# Patient Record
Sex: MALE | Race: WHITE | Employment: FULL TIME | URBAN - METROPOLITAN AREA
[De-identification: names, ages, dates, MRNs, and addresses within clinical notes are randomized per-mention and may not be internally consistent; named-entity substitution may affect disease eponyms.]

---

## 2018-12-20 ENCOUNTER — HOSPITAL ENCOUNTER (OUTPATIENT)
Age: 33
Discharge: HOME | End: 2018-12-20
Attending: EMERGENCY MEDICINE
Payer: COMMERCIAL

## 2018-12-20 VITALS
TEMPERATURE: 99 F | WEIGHT: 205 LBS | SYSTOLIC BLOOD PRESSURE: 142 MMHG | DIASTOLIC BLOOD PRESSURE: 85 MMHG | OXYGEN SATURATION: 97 % | HEART RATE: 78 BPM | RESPIRATION RATE: 18 BRPM

## 2018-12-20 DIAGNOSIS — J06.9 VIRAL UPPER RESPIRATORY INFECTION: Primary | ICD-10-CM

## 2018-12-20 DIAGNOSIS — H69.82 EUSTACHIAN TUBE DYSFUNCTION, LEFT: ICD-10-CM

## 2018-12-20 PROCEDURE — 99202 OFFICE O/P NEW SF 15 MIN: CPT

## 2018-12-20 RX ORDER — TEMAZEPAM 30 MG/1
60 CAPSULE ORAL NIGHTLY
COMMUNITY

## 2018-12-20 RX ORDER — PAROXETINE HYDROCHLORIDE 40 MG/1
40 TABLET, FILM COATED ORAL EVERY MORNING
COMMUNITY

## 2018-12-20 RX ORDER — FLUTICASONE PROPIONATE 50 MCG
1-2 SPRAY, SUSPENSION (ML) NASAL DAILY
Qty: 16 G | Refills: 0 | Status: SHIPPED | OUTPATIENT
Start: 2018-12-20 | End: 2019-01-19

## 2018-12-20 RX ORDER — OMEPRAZOLE 20 MG/1
40 CAPSULE, DELAYED RELEASE ORAL EVERY MORNING
COMMUNITY

## 2018-12-20 NOTE — ED PROVIDER NOTES
[facility]    History   Patient presents with:  Ear Problem Pain (neurosensory)    Stated Complaint: EAR PAIN/PRESSURE    HPI  Patient noted a mild runny nose and scratchiness to the back of his throat an Nose: Rhinorrhea present. Mouth/Throat: Uvula is midline, oropharynx is clear and moist and mucous membranes are normal.   Eyes: Conjunctivae and EOM are normal.   Neck: Normal range of motion. Neck supple.    Cardiovascular: Normal rate, regular rhythm

## 2019-01-12 ENCOUNTER — HOSPITAL ENCOUNTER (OUTPATIENT)
Age: 34
Discharge: HOME | End: 2019-01-12
Attending: FAMILY MEDICINE
Payer: COMMERCIAL

## 2019-01-12 VITALS
OXYGEN SATURATION: 95 % | WEIGHT: 205 LBS | RESPIRATION RATE: 18 BRPM | HEART RATE: 99 BPM | HEIGHT: 71 IN | BODY MASS INDEX: 28.7 KG/M2 | SYSTOLIC BLOOD PRESSURE: 148 MMHG | DIASTOLIC BLOOD PRESSURE: 97 MMHG | TEMPERATURE: 98 F

## 2019-01-12 DIAGNOSIS — J01.90 ACUTE SINUSITIS, RECURRENCE NOT SPECIFIED, UNSPECIFIED LOCATION: Primary | ICD-10-CM

## 2019-01-12 PROCEDURE — 99214 OFFICE O/P EST MOD 30 MIN: CPT

## 2019-01-12 PROCEDURE — 99213 OFFICE O/P EST LOW 20 MIN: CPT

## 2019-01-12 RX ORDER — AMOXICILLIN AND CLAVULANATE POTASSIUM 875; 125 MG/1; MG/1
1 TABLET, FILM COATED ORAL 2 TIMES DAILY
Qty: 20 TABLET | Refills: 0 | Status: SHIPPED | OUTPATIENT
Start: 2019-01-12 | End: 2019-01-22

## 2019-01-12 NOTE — ED INITIAL ASSESSMENT (HPI)
States sick for 1 month with \"sinus issues\". Seen here 12/20 and prescribed flonase which relieved some of his symptoms. Follow up with PMD shortly after and given amoxicillin. Completed 10 day course without relief. C/o \"green\" sinus drainage.  Facial

## 2019-01-12 NOTE — ED PROVIDER NOTES
Pt seen in [facility] [place]      Stated Complaint: Patient presents with:  Cough/URI      --------------   RN assessment:     Sinus issues x I mo; s/p abtc's (?12/23-1/2). ..persist  --------------    CC: sinus congestion    HPI: negative for claudication and palpitations  Gastrointestinal: negative for  jaundice and odynophagia  Genitourinary:negative for decreased stream and nocturia  Behavioral/Psych: negative for aggressive behavior and hallucinations  10 point review of system Impression:    Acute sinusitis, recurrence not specified, unspecified location  (primary encounter diagnosis)    Disposition: Home     Discharge    Follow-up:    No follow-up provider specified.       Medications Prescribed:    Current Discharge Medication

## 2019-01-25 ENCOUNTER — OFFICE VISIT (OUTPATIENT)
Dept: AUDIOLOGY | Facility: CLINIC | Age: 34
End: 2019-01-25
Payer: COMMERCIAL

## 2019-01-25 ENCOUNTER — OFFICE VISIT (OUTPATIENT)
Dept: OTOLARYNGOLOGY | Facility: CLINIC | Age: 34
End: 2019-01-25
Payer: COMMERCIAL

## 2019-01-25 VITALS — WEIGHT: 205 LBS | HEIGHT: 71 IN | BODY MASS INDEX: 28.7 KG/M2

## 2019-01-25 DIAGNOSIS — H93.13 RINGING IN EAR, BILATERAL: Primary | ICD-10-CM

## 2019-01-25 DIAGNOSIS — H93.13 TINNITUS, BILATERAL: Primary | ICD-10-CM

## 2019-01-25 PROCEDURE — 99212 OFFICE O/P EST SF 10 MIN: CPT | Performed by: OTOLARYNGOLOGY

## 2019-01-25 PROCEDURE — 92567 TYMPANOMETRY: CPT | Performed by: AUDIOLOGIST

## 2019-01-25 PROCEDURE — 92557 COMPREHENSIVE HEARING TEST: CPT | Performed by: AUDIOLOGIST

## 2019-01-25 PROCEDURE — 99243 OFF/OP CNSLTJ NEW/EST LOW 30: CPT | Performed by: OTOLARYNGOLOGY

## 2019-01-25 NOTE — PROGRESS NOTES
Lelia Ravi is a 35year old male.  Patient presents with:  Ringing In Ear: in left ear  Nose Problem: had a sinus infection, completed antibiotics, nasal congestion, sore throat on right side, ears feels plugged     HPI:   He has had a ringing in his lef person & situation. Appropriate mood and affect. Lymph Detail Normal Submental. Submandibular. Anterior cervical. Posterior cervical. Supraclavicular.    Eyes Normal Conjunctiva - Right: Normal, Left: Normal. Pupil - Right: Normal, Left: Normal.    Ears N

## 2019-01-29 NOTE — PROGRESS NOTES
AUDIOLOGY REPORT      Cori Garces is a 35year old male     Referring Provider: Janae Weiss   YOB: 1985  Medical Record: [id number]      Patient Hearing History:  Patient reported tinnitus.        Otoscopic Inspection:  Right ear:  No ceru

## 2021-11-30 ENCOUNTER — HOSPITAL ENCOUNTER (EMERGENCY)
Facility: HOSPITAL | Age: 36
Discharge: HOME | End: 2021-11-30
Payer: COMMERCIAL

## 2021-11-30 ENCOUNTER — APPOINTMENT (OUTPATIENT)
Dept: CT IMAGING | Facility: HOSPITAL | Age: 36
End: 2021-11-30
Attending: NURSE PRACTITIONER
Payer: COMMERCIAL

## 2021-11-30 ENCOUNTER — HOSPITAL ENCOUNTER (OUTPATIENT)
Age: 36
Discharge: ANOTHER HOSPITAL | End: 2021-11-30
Attending: EMERGENCY MEDICINE
Payer: COMMERCIAL

## 2021-11-30 VITALS
SYSTOLIC BLOOD PRESSURE: 130 MMHG | DIASTOLIC BLOOD PRESSURE: 90 MMHG | RESPIRATION RATE: 18 BRPM | TEMPERATURE: 98 F | OXYGEN SATURATION: 100 % | HEART RATE: 100 BPM

## 2021-11-30 VITALS
HEIGHT: 71 IN | RESPIRATION RATE: 18 BRPM | TEMPERATURE: 98 F | OXYGEN SATURATION: 98 % | BODY MASS INDEX: 26.6 KG/M2 | DIASTOLIC BLOOD PRESSURE: 84 MMHG | HEART RATE: 75 BPM | SYSTOLIC BLOOD PRESSURE: 155 MMHG | WEIGHT: 190 LBS

## 2021-11-30 DIAGNOSIS — R10.10 UPPER ABDOMINAL PAIN: Primary | ICD-10-CM

## 2021-11-30 DIAGNOSIS — K52.9 ACUTE GASTROENTERITIS: Primary | ICD-10-CM

## 2021-11-30 PROCEDURE — 99213 OFFICE O/P EST LOW 20 MIN: CPT

## 2021-11-30 PROCEDURE — 36415 COLL VENOUS BLD VENIPUNCTURE: CPT

## 2021-11-30 PROCEDURE — 85025 COMPLETE CBC W/AUTO DIFF WBC: CPT

## 2021-11-30 PROCEDURE — 80053 COMPREHEN METABOLIC PANEL: CPT

## 2021-11-30 PROCEDURE — 81002 URINALYSIS NONAUTO W/O SCOPE: CPT

## 2021-11-30 PROCEDURE — 74177 CT ABD & PELVIS W/CONTRAST: CPT | Performed by: NURSE PRACTITIONER

## 2021-11-30 PROCEDURE — 99214 OFFICE O/P EST MOD 30 MIN: CPT

## 2021-11-30 PROCEDURE — 87086 URINE CULTURE/COLONY COUNT: CPT | Performed by: EMERGENCY MEDICINE

## 2021-11-30 PROCEDURE — 99284 EMERGENCY DEPT VISIT MOD MDM: CPT

## 2021-11-30 PROCEDURE — 83690 ASSAY OF LIPASE: CPT

## 2021-11-30 NOTE — ED INITIAL ASSESSMENT (HPI)
Patient c/o of bilateral lower abdominal pain for 2 days  Bloated  Soft stool today- denies any blood in stool  Nausea, no emesis or diarrhea  Denies any urinary symptoms

## 2021-11-30 NOTE — ED PROVIDER NOTES
Patient Seen in: [facility] [place]      History   Patient presents with:  Abdominal Pain    Stated Complaint: ABDOMINAL PAIN    Subjective:   HPI    59-year-old male presents for evaluation of abdominal pain.   Patient reports since Sunday night he h Tenderness: There is abdominal tenderness in the right upper quadrant and epigastric area. Musculoskeletal:         General: Normal range of motion. Cervical back: Normal range of motion and neck supple. Skin:     General: Skin is warm and dry.

## 2021-12-01 NOTE — ED PROVIDER NOTES
Patient Seen in: [facility]      History   Patient presents with:  Abdomen/Flank Pain    Stated Complaint: abd pain    Subjective:   49-year-old male presenting with upper abdominal pain onset 2 days ago.   He describes the [place] 155/84   Pulse 75   Temp 97.9 °F (36.6 °C) (Temporal)   Resp 18   Ht 180.3 cm (5' 11\")   Wt 86.2 kg   SpO2 98%   BMI 26.50 kg/m²         Physical Exam  Vitals and nursing note reviewed. Constitutional:       Appearance: Normal appearance.    HENT:      H Status                     ---------                               -----------         ------                     CBC W/ DIFFERENTIAL[[id number]]                              Final result                 Please view results for these tests on the individual Candidate normal appendix in the right lower quadrant (series 5, image 47). URINARY BLADDER: Incompletely distended and suboptimally evaluated. PELVIC NODES: No lymphadenopathy. PELVIC ORGANS: No visible mass. Pelvic organs appropriate for patient age. MD  [address] [phone]      As needed          Medications Prescribed:  Current Discharge Medication List

## 2024-02-14 ENCOUNTER — HOSPITAL ENCOUNTER (OUTPATIENT)
Age: 39
Discharge: HOME | End: 2024-02-14
Payer: COMMERCIAL

## 2024-02-14 VITALS
TEMPERATURE: 98 F | OXYGEN SATURATION: 97 % | RESPIRATION RATE: 16 BRPM | HEART RATE: 98 BPM | SYSTOLIC BLOOD PRESSURE: 128 MMHG | DIASTOLIC BLOOD PRESSURE: 89 MMHG

## 2024-02-14 DIAGNOSIS — H66.002 NON-RECURRENT ACUTE SUPPURATIVE OTITIS MEDIA OF LEFT EAR WITHOUT SPONTANEOUS RUPTURE OF TYMPANIC MEMBRANE: Primary | ICD-10-CM

## 2024-02-14 PROCEDURE — 99214 OFFICE O/P EST MOD 30 MIN: CPT

## 2024-02-14 PROCEDURE — 99213 OFFICE O/P EST LOW 20 MIN: CPT

## 2024-02-14 RX ORDER — AMOXICILLIN 500 MG/1
500 TABLET, FILM COATED ORAL 3 TIMES DAILY
Qty: 30 TABLET | Refills: 0 | Status: SHIPPED | OUTPATIENT
Start: 2024-02-14 | End: 2024-02-24

## 2024-02-14 NOTE — ED PROVIDER NOTES
Patient Seen in: [facility] [place]      History     Chief Complaint   Patient presents with    Ear Pain     Stated Complaint: Ear problem    Subjective:   38-year-old male with depression, anxiety, 1 kidney presents from home with complaint of left ear pain.  Woke up with the pain today.  Feels like he has fluid in his ear.  No drainage.  No fever.  He did recently have flu A.  He took Aleve for pain.    The history is provided by the patient. No  was used.         Objective:   No pertinent past medical history.        HISTORY:  Past Medical History:   Diagnosis Date    Anxiety     Esophageal reflux       Past Surgical History:   Procedure Laterality Date    VASECTOMY  03/24/2022    w/ Dr Urena       No family history on file.   Social History     Socioeconomic History    Marital status:    Tobacco Use    Smoking status: Never    Smokeless tobacco: Never            No pertinent past surgical history.              No pertinent social history.            Review of Systems    Positive for stated complaint: Ear problem  Other systems are as noted in HPI.  Constitutional and vital signs reviewed.      All other systems reviewed and negative except as noted above.    Physical Exam     ED Triage Vitals [02/14/24 0826]   /89   Pulse 98   Resp 16   Temp 98.1 °F (36.7 °C)   Temp src Temporal   SpO2 97 %   O2 Device None (Room air)       Current:/89   Pulse 98   Temp 98.1 °F (36.7 °C) (Temporal)   Resp 16   SpO2 97%         Physical Exam  Vitals and nursing note reviewed.   Constitutional:       General: He is not in acute distress.     Appearance: Normal appearance. He is not ill-appearing or toxic-appearing.   HENT:      Head: Normocephalic and atraumatic.      Right Ear: Tympanic membrane, ear canal and external ear normal.      Left Ear: Ear canal and external ear normal. Tympanic membrane is erythematous.      Nose: Nose normal.      Mouth/Throat:      Mouth: Mucous  membranes are moist.      Pharynx: Oropharynx is clear.   Eyes:      Pupils: Pupils are equal, round, and reactive to light.   Cardiovascular:      Rate and Rhythm: Normal rate and regular rhythm.      Pulses: Normal pulses.   Pulmonary:      Effort: Pulmonary effort is normal. No respiratory distress.      Breath sounds: Normal breath sounds.      Comments: Lungs clear.  No adventitious lung sounds.  No distress.  No hypoxia.  Pulse ox 97% ra. Which is normal    Abdominal:      General: Abdomen is flat.      Palpations: Abdomen is soft.   Musculoskeletal:         General: No signs of injury. Normal range of motion.      Cervical back: Normal range of motion and neck supple.   Skin:     General: Skin is warm and dry.      Capillary Refill: Capillary refill takes less than 2 seconds.   Neurological:      General: No focal deficit present.      Mental Status: He is alert and oriented to person, place, and time.      GCS: GCS eye subscore is 4. GCS verbal subscore is 5. GCS motor subscore is 6.   Psychiatric:         Mood and Affect: Mood normal.         Behavior: Behavior normal.         Thought Content: Thought content normal.         Judgment: Judgment normal.           ED Course   Labs Reviewed - No data to display    MDM        Medical Decision Making  Left otitis media  Differential diagnosis: Otitis media, otitis externa  Left TM is erythematous.  No TM rupture.  Consistent with otitis media.  Right TM unremarkable  No evidence of otitis externa or mastoiditis  Plan of care: Amoxicillin.  Continue Aleve as needed for pain  Results and plan of care discussed with the patient/family. They are in agreement with discharge. They understand to follow up with their primary doctor or the referral physician for further evaluation, especially if no improvement.  Also discussed the limitations of immediate care, patient is aware that if symptoms are worse they should go to the emergency room. Verbal and written discharge  instructions were given.       Problems Addressed:  Non-recurrent acute suppurative otitis media of left ear without spontaneous rupture of tympanic membrane: acute illness or injury    Risk  OTC drugs.  Prescription drug management.        Disposition and Plan     Clinical Impression:  1. Non-recurrent acute suppurative otitis media of left ear without spontaneous rupture of tympanic membrane         Disposition:  Discharge  2/14/2024  8:26 am    Follow-up:  Vasile Jacques MD  [address] [address]  [phone]                Medications Prescribed:  Current Discharge Medication List        START taking these medications    Details   amoxicillin 500 MG Oral Tab Take 1 tablet (500 mg total) by mouth 3 (three) times daily for 10 days.  Qty: 30 tablet, Refills: 0

## 2024-02-17 ENCOUNTER — HOSPITAL ENCOUNTER (OUTPATIENT)
Age: 39
Discharge: HOME | End: 2024-02-17
Payer: COMMERCIAL

## 2024-02-17 VITALS
HEART RATE: 70 BPM | RESPIRATION RATE: 20 BRPM | TEMPERATURE: 98 F | DIASTOLIC BLOOD PRESSURE: 83 MMHG | OXYGEN SATURATION: 97 % | SYSTOLIC BLOOD PRESSURE: 132 MMHG

## 2024-02-17 DIAGNOSIS — H66.92 LEFT OTITIS MEDIA, UNSPECIFIED OTITIS MEDIA TYPE: Primary | ICD-10-CM

## 2024-02-17 PROCEDURE — 99213 OFFICE O/P EST LOW 20 MIN: CPT

## 2024-02-17 RX ORDER — AMOXICILLIN AND CLAVULANATE POTASSIUM 875; 125 MG/1; MG/1
1 TABLET, FILM COATED ORAL 2 TIMES DAILY
Qty: 20 TABLET | Refills: 0 | Status: SHIPPED | OUTPATIENT
Start: 2024-02-17 | End: 2024-02-27

## 2024-02-17 NOTE — ED INITIAL ASSESSMENT (HPI)
Was seen here 2/14 for ear infection, currently on amoxicillin, still with left ear pain with muffled sound, denies sinus congestion

## 2024-02-17 NOTE — ED PROVIDER NOTES
Patient Seen in: [facility] [place]      History     Chief Complaint   Patient presents with    Ear Problem Pain     Stated Complaint: ear infection  Subjective:   38-year-old male presents for left ear pain.  He was seen here 2 days ago and diagnosed with an otitis media.  He was placed on amoxicillin, but is still having pain.  He has been taking naproxen with some relief.  No drainage from the ear.  No hearing loss.  No mastoid complaints.  No URI symptoms.  No fevers.  No headaches or dizziness.  He appears nontoxic.      Objective:   Past Medical History:   Diagnosis Date    Anxiety     Esophageal reflux             Past Surgical History:   Procedure Laterality Date    VASECTOMY  03/24/2022    w/ Dr Urena               Social History     Socioeconomic History    Marital status:    Tobacco Use    Smoking status: Never    Smokeless tobacco: Never   Substance and Sexual Activity    Alcohol use: Not Currently    Drug use: Not Currently            Review of Systems   HENT:  Positive for ear pain.    All other systems reviewed and are negative.      Positive for stated complaint: ear infection  Other systems are as noted in HPI.  Constitutional and vital signs reviewed.      All other systems reviewed and negative except as noted above.    Physical Exam     ED Triage Vitals [02/17/24 0858]   /83   Pulse 70   Resp 20   Temp 98.1 °F (36.7 °C)   Temp src Temporal   SpO2 97 %   O2 Device None (Room air)     Current:/83   Pulse 70   Temp 98.1 °F (36.7 °C) (Temporal)   Resp 20   SpO2 97%     Physical Exam  Vitals and nursing note reviewed.   Constitutional:       General: He is not in acute distress.     Appearance: Normal appearance. He is not toxic-appearing.   HENT:      Head: Normocephalic.      Right Ear: Tympanic membrane and ear canal normal.      Left Ear: Ear canal normal. A middle ear effusion is present. No mastoid tenderness. Tympanic membrane is erythematous.      Nose: Nose  normal.      Mouth/Throat:      Mouth: Mucous membranes are moist.      Pharynx: Oropharynx is clear. No oropharyngeal exudate or posterior oropharyngeal erythema.   Eyes:      Conjunctiva/sclera: Conjunctivae normal.      Pupils: Pupils are equal, round, and reactive to light.   Cardiovascular:      Rate and Rhythm: Normal rate and regular rhythm.   Pulmonary:      Effort: Pulmonary effort is normal.      Breath sounds: Normal breath sounds.   Musculoskeletal:         General: Normal range of motion.      Cervical back: Normal range of motion and neck supple.   Lymphadenopathy:      Cervical: No cervical adenopathy.   Skin:     General: Skin is warm and dry.      Capillary Refill: Capillary refill takes less than 2 seconds.      Findings: No rash.   Neurological:      General: No focal deficit present.      Mental Status: He is alert and oriented to person, place, and time.   Psychiatric:         Mood and Affect: Mood normal.         Behavior: Behavior normal.         ED Course   Labs Reviewed - No data to display      MDM       Medical Decision Making  I will change antibiotic from amoxicillin to Augmentin, the left ear still appears infected.  We discussed continuing naproxen as needed for pain and follow-up as needed with ENT.    Risk  OTC drugs.  Prescription drug management.  Risk Details: Otitis media versus cerumen impaction        Disposition and Plan     Clinical Impression:  1. Left otitis media, unspecified otitis media type         Disposition:  Discharge  2/17/2024  9:08 am    Follow-up:  Aamir Rodriguez MD  [address] [place] [id number]  [phone]    Schedule an appointment as soon as possible for a visit   As needed          Medications Prescribed:  Current Discharge Medication List        START taking these medications    Details   amoxicillin clavulanate 875-125 MG Oral Tab Take 1 tablet by mouth 2 (two) times daily for 10 days.  Qty: 20 tablet, Refills: 0

## 2024-02-17 NOTE — DISCHARGE INSTRUCTIONS
Stop the amoxicillin and start the Augmentin.  Continue naproxen as needed for pain.  Follow-up with ENT as needed.  Return for any concerns.

## 2025-05-27 ENCOUNTER — HOSPITAL ENCOUNTER (OUTPATIENT)
Age: 40
Discharge: HOME | End: 2025-05-27
Payer: COMMERCIAL

## 2025-05-27 VITALS
DIASTOLIC BLOOD PRESSURE: 87 MMHG | TEMPERATURE: 98 F | RESPIRATION RATE: 16 BRPM | HEART RATE: 59 BPM | SYSTOLIC BLOOD PRESSURE: 115 MMHG | OXYGEN SATURATION: 98 %

## 2025-05-27 DIAGNOSIS — Z88.9 HISTORY OF SEASONAL ALLERGIES: ICD-10-CM

## 2025-05-27 DIAGNOSIS — J02.9 ACUTE VIRAL PHARYNGITIS: Primary | ICD-10-CM

## 2025-05-27 LAB — S PYO AG THROAT QL IA.RAPID: NEGATIVE

## 2025-05-27 PROCEDURE — 99213 OFFICE O/P EST LOW 20 MIN: CPT

## 2025-05-27 PROCEDURE — 87651 STREP A DNA AMP PROBE: CPT | Performed by: PHYSICIAN ASSISTANT

## 2025-05-27 RX ORDER — DEXAMETHASONE 4 MG/1
8 TABLET ORAL ONCE
Status: COMPLETED | OUTPATIENT
Start: 2025-05-27 | End: 2025-05-27

## 2025-05-27 NOTE — ED PROVIDER NOTES
Patient Seen in: [facility] [place]        History  Chief Complaint   Patient presents with    Sore Throat    Fever     Stated Complaint: Sore Throat    Subjective:   HPI            Patient is a 40-year-old male with seasonal allergies, GERD, anxiety, non-smoker, presenting to immediate care for evaluation of sore throat.  Onset: Yesterday.  He endorses low-grade fever with nasal congestion, sore throat, pain with swallowing, and slight nonproductive cough.  No medications taken for symptoms.  Concern for possible strep throat.  Does have history of seasonal allergies.  Not currently on medication.  No fever in clinic.  No facial swelling no trismus or drooling no neck stiffness.  No chest pain or shortness of breath.  Not immunocompromise.      Objective:     Past Medical History:    Anxiety    Esophageal reflux              Past Surgical History:   Procedure Laterality Date    Vasectomy  03/24/2022    w/ Dr Urena                 Social History     Socioeconomic History    Marital status:    Tobacco Use    Smoking status: Never    Smokeless tobacco: Never   Substance and Sexual Activity    Alcohol use: Not Currently    Drug use: Not Currently              Review of Systems   Constitutional:  Negative for chills and fever.   HENT:  Positive for congestion and sore throat. Negative for ear pain, trouble swallowing and voice change.    Respiratory:  Positive for cough. Negative for shortness of breath.    Cardiovascular:  Negative for chest pain.   Gastrointestinal:  Negative for abdominal pain, diarrhea and vomiting.   Musculoskeletal:  Negative for neck stiffness.   Skin:  Negative for rash.   Allergic/Immunologic: Positive for environmental allergies. Negative for immunocompromised state.   Neurological:  Negative for dizziness, weakness and light-headedness.   Psychiatric/Behavioral:  Negative for confusion.    All other systems reviewed and are negative.      Positive for stated complaint: Sore  Throat  Other systems are as noted in HPI.  Constitutional and vital signs reviewed.      All other systems reviewed and negative except as noted above.                  Physical Exam    ED Triage Vitals [05/27/25 1027]   /87   Pulse 59   Resp 16   Temp 98.4 °F (36.9 °C)   Temp src Oral   SpO2 98 %   O2 Device None (Room air)       Current Vitals:   Vital Signs  BP: 115/87  Pulse: 59  Resp: 16  Temp: 98.4 °F (36.9 °C)  Temp src: Oral    Oxygen Therapy  SpO2: 98 %  O2 Device: None (Room air)            Physical Exam  Vitals and nursing note reviewed.   Constitutional:       General: He is not in acute distress.     Appearance: Normal appearance. He is well-developed. He is not ill-appearing, toxic-appearing or diaphoretic.      Comments: Alert, well-appearing, no acute distress   HENT:      Head: Normocephalic and atraumatic.      Right Ear: Tympanic membrane normal. No middle ear effusion. Tympanic membrane is not erythematous.      Left Ear: Tympanic membrane normal.  No middle ear effusion. Tympanic membrane is not erythematous.      Ears:      Comments: Bilateral ear effusion without infection.     Nose: Congestion present.      Mouth/Throat:      Mouth: Mucous membranes are moist.      Tonsils: No tonsillar exudate or tonsillar abscesses. 1+ on the right. 1+ on the left.      Comments: Uvula midline.  Postnasal drip.  Oropharyngeal erythema.  No tonsillar exudates.  No trismus or drooling  Eyes:      Conjunctiva/sclera: Conjunctivae normal.   Neck:      Comments: No cervical lymphadenopathy.  Cardiovascular:      Rate and Rhythm: Normal rate and regular rhythm.      Pulses: Normal pulses.   Pulmonary:      Effort: Pulmonary effort is normal. No respiratory distress.      Breath sounds: Normal breath sounds. No wheezing, rhonchi or rales.   Musculoskeletal:         General: No deformity. Normal range of motion.      Cervical back: Normal range of motion. No rigidity.   Neurological:      General: No focal  deficit present.      Mental Status: He is alert and oriented to person, place, and time.      Motor: No weakness.      Gait: Gait normal.   Psychiatric:         Mood and Affect: Mood normal.         Behavior: Behavior normal.             ED Course  Labs Reviewed   RAPID STREP A - Normal     Results for orders placed or performed during the hospital encounter of 05/27/25   Rapid Strep A - ID NOW    Collection Time: 05/27/25 10:30 AM    Specimen: Throat; Other   Result Value Ref Range    Strep A by PCR Negative Negative                  MDM    Differential diagnoses considered included, but are not exclusive of: viral upper respiratory infection, URI, pharyngitis, strep throat, tonsillitis, uvulitis, allergic rhinitis, etc.    Dx: Acute Viral Pharyngitis, Initial Encounter  Strep PCR  testing negative  Overall well-appearing  Hemodynamically stable  Afebrile  Tolerating PO  Outpatient management  Supportive care  Rest  Oral hydration  Oral antihistamine and flonase for allergic rhinitis  Motrin/Tylenol as needed for pain/fever  Lozenges for sore throat  Honey-Tea  PCP follow  Return precaution          Medical Decision Making      Disposition and Plan     Clinical Impression:  1. Acute viral pharyngitis    2. History of seasonal allergies         Disposition:  Discharge  5/27/2025 10:49 am    Follow-up:  No follow-up provider specified.        Medications Prescribed:  Current Discharge Medication List                Supplementary Documentation: